# Patient Record
Sex: FEMALE | Race: WHITE | NOT HISPANIC OR LATINO | URBAN - METROPOLITAN AREA
[De-identification: names, ages, dates, MRNs, and addresses within clinical notes are randomized per-mention and may not be internally consistent; named-entity substitution may affect disease eponyms.]

---

## 2025-07-16 ENCOUNTER — ANCILLARY PROCEDURE (OUTPATIENT)
Dept: URGENT CARE | Age: 70
End: 2025-07-16
Payer: MEDICARE

## 2025-07-16 ENCOUNTER — OFFICE VISIT (OUTPATIENT)
Dept: URGENT CARE | Age: 70
End: 2025-07-16
Payer: MEDICARE

## 2025-07-16 VITALS
SYSTOLIC BLOOD PRESSURE: 138 MMHG | HEIGHT: 68 IN | DIASTOLIC BLOOD PRESSURE: 87 MMHG | HEART RATE: 87 BPM | WEIGHT: 170 LBS | RESPIRATION RATE: 18 BRPM | OXYGEN SATURATION: 98 % | TEMPERATURE: 97.9 F | BODY MASS INDEX: 25.76 KG/M2

## 2025-07-16 DIAGNOSIS — M25.571 RIGHT ANKLE PAIN, UNSPECIFIED CHRONICITY: ICD-10-CM

## 2025-07-16 PROCEDURE — 73610 X-RAY EXAM OF ANKLE: CPT | Mod: RIGHT SIDE | Performed by: PHYSICIAN ASSISTANT

## 2025-07-16 RX ORDER — LUTEIN 6 MG
TABLET ORAL
COMMUNITY

## 2025-07-16 RX ORDER — ATORVASTATIN CALCIUM 40 MG/1
TABLET, FILM COATED ORAL
COMMUNITY
Start: 2024-04-01

## 2025-07-16 RX ORDER — FAMOTIDINE 20 MG/1
TABLET, FILM COATED ORAL
COMMUNITY
Start: 2024-08-12

## 2025-07-16 RX ORDER — GABAPENTIN 600 MG/1
TABLET ORAL
COMMUNITY

## 2025-07-16 RX ORDER — ASPIRIN 81 MG/1
TABLET ORAL
COMMUNITY

## 2025-07-16 RX ORDER — METRONIDAZOLE 500 MG/1
1 TABLET ORAL
COMMUNITY
Start: 2025-05-22

## 2025-07-16 RX ORDER — ESTRADIOL 0.1 MG/G
CREAM VAGINAL
COMMUNITY
Start: 2024-08-12

## 2025-07-16 RX ORDER — BUPROPION HYDROCHLORIDE 150 MG/1
TABLET, EXTENDED RELEASE ORAL
COMMUNITY
Start: 2024-08-12

## 2025-07-16 RX ORDER — DIAZEPAM 5 MG/1
TABLET ORAL
COMMUNITY
Start: 2023-11-20

## 2025-07-16 RX ORDER — FEXOFENADINE HCL 180 MG/1
TABLET ORAL
COMMUNITY
Start: 2024-05-21

## 2025-07-16 RX ORDER — TIZANIDINE 4 MG/1
TABLET ORAL
COMMUNITY

## 2025-07-16 RX ORDER — DICLOFENAC SODIUM 10 MG/G
GEL TOPICAL
COMMUNITY

## 2025-07-16 RX ORDER — FLUTICASONE PROPIONATE 50 MCG
SPRAY, SUSPENSION (ML) NASAL
COMMUNITY
Start: 2025-07-02

## 2025-07-16 RX ORDER — FLUCONAZOLE 150 MG/1
TABLET ORAL
COMMUNITY
Start: 2025-05-22

## 2025-07-16 RX ORDER — IBUPROFEN 800 MG/1
TABLET, FILM COATED ORAL
COMMUNITY
Start: 2025-04-30

## 2025-07-16 RX ORDER — METOPROLOL SUCCINATE 100 MG/1
TABLET, EXTENDED RELEASE ORAL
COMMUNITY

## 2025-07-16 RX ORDER — ONDANSETRON 4 MG/1
TABLET, FILM COATED ORAL
COMMUNITY

## 2025-07-16 RX ORDER — ESOMEPRAZOLE MAGNESIUM 40 MG/1
CAPSULE, DELAYED RELEASE ORAL
COMMUNITY
Start: 2025-04-30

## 2025-07-16 ASSESSMENT — PAIN SCALES - GENERAL: PAINLEVEL_OUTOF10: 8

## 2025-07-16 NOTE — PROGRESS NOTES
"Subjective   Patient ID: Teresita Casey is a 69 y.o. female. They present today with a chief complaint of right ankle injury    Patient disposition: Home    HISTORY OF PRESENT ILLNESS:    This is a 68yo female with PMH of several ankle and leg fractures. She presents for R ankle injury 2 days ago. Was squatting at home and lost balance falling on buttocks and twisting R ankle. Downs immediate R ankle pain. Was very swollen yesterday, not as much today. Pain mild and can ambulate normally. Denies injury to other body parts.      Past Medical History  Allergies as of 07/16/2025 - Reviewed 07/16/2025   Allergen Reaction Noted    Nickel Hives, Rash, and Swelling 02/04/2015    Scopolamine Itching and Swelling 10/25/2016    Bacitracin zinc-polymyxin b Unknown 07/16/2025    Morphine Unknown 07/16/2025    Neomycin-bacitracin-polymyxin Unknown 07/16/2025    Ondansetron Unknown 07/16/2025    Sulfa dyne Nausea Only 07/16/2025    Pantoprazole Rash 12/19/2014    Sulfa (sulfonamide antibiotics) Unknown 07/15/2008       Prescriptions Prior to Admission[1]     Medical History[2]    Surgical History[3]     reports that she has quit smoking. Her smoking use included cigarettes. She has never used smokeless tobacco. She reports current alcohol use. She reports that she does not use drugs.    Review of Systems    Negative except as documented in the History of Present Illness.                             Objective    Vitals:    07/16/25 1431   BP: 138/87   BP Location: Left arm   Patient Position: Sitting   BP Cuff Size: Large adult   Pulse: 87   Resp: 18   Temp: 36.6 °C (97.9 °F)   TempSrc: Oral   SpO2: 98%   Weight: 77.1 kg (170 lb)   Height: 1.727 m (5' 8\")     No LMP recorded (lmp unknown). (Menstrual status: Menopausal).      PHYSICAL EXAMINATION:    CONSTITUTIONAL: nontoxic, ambulatory, well-appearing    EYES:  No scleral icterus or orbital trauma noted. No conjunctival injection. PERRLA. EOMI b/l. No nystagmus.    HEENT:  Head " and face are unremarkable and atraumatic. Mucous membranes moist. Nares are patent without copious rhinorrhea.  No lymphadenopathy.    CARDIOVASCULAR:  RRR, no m/r/g. Nl S1/S2.     RLE    *Distal pulses intact, capillary refill 1 second in distal digits.    LUNGS:  CTAB, no r/r/w. No dullness to percussion.    ABDOMEN:  Nonobese, nonscaphoid..    SKIN: Normal skin exam of RLE    MUSCULOSKELETAL:     RLE    * ROM is FROM wo pain    * Tenderness is focal on R ATFL, NTTP elsewhere on RLE    * Soft tissue swelling is absent    Gait is nl         NEURO:     RLE    * Motor function is distally intact    * Sensation is distally intact         PSYCH: Appropriate mood and affect.         ---------------------------------------------------------------         MDM:  XR interpreted by me independently negative for fx, positive for degenerative changes. Will fu with her orthopedist PRN if unimproved. Will use APAP PRN for pain.          Procedures    Diagnostic study results (if any) were reviewed by Chace Das PA-C.    No results found for this visit on 07/16/25.     Assessment/Plan   Allergies, medications, history, and pertinent labs/EKGs/Imaging reviewed by Chace Das PA-C.     Orders and Diagnoses  Diagnoses and all orders for this visit:  Right ankle pain, unspecified chronicity  -     XR ankle right 3+ views; Future      Medical Admin Record      Follow Up Instructions  No follow-ups on file.    Electronically signed by Chace Das PA-C  3:06 PM         [1] (Not in a hospital admission)  [2] No past medical history on file.  [3] No past surgical history on file.

## 2025-07-16 NOTE — PATIENT INSTRUCTIONS
Ice and elevate the injured ankle for 20 minutes at a time several times per day. Use Tylenol as needed for pain. Wear sleeve. Follow up with orthopedics as needed if unimproved.